# Patient Record
Sex: MALE | Race: WHITE
[De-identification: names, ages, dates, MRNs, and addresses within clinical notes are randomized per-mention and may not be internally consistent; named-entity substitution may affect disease eponyms.]

---

## 2019-03-11 ENCOUNTER — HOSPITAL ENCOUNTER (OUTPATIENT)
Dept: HOSPITAL 44 - OPSURG | Age: 69
Discharge: HOME | End: 2019-03-11
Attending: ORTHOPAEDIC SURGERY
Payer: MEDICARE

## 2019-03-11 DIAGNOSIS — G56.02: Primary | ICD-10-CM

## 2019-03-11 DIAGNOSIS — G56.22: ICD-10-CM

## 2019-03-11 PROCEDURE — 64718 REVISE ULNAR NERVE AT ELBOW: CPT

## 2019-03-11 PROCEDURE — 64721 CARPAL TUNNEL SURGERY: CPT

## 2019-03-12 NOTE — OPERATIVE NOTE
PREOPERATIVE DIAGNOSIS:  

1. Carpal tunnel syndrome, left arm.  

2. Cubital tunnel syndrome, left arm.  



POSTOPERATIVE DIAGNOSIS:  

1. Carpal tunnel syndrome, left arm.  

2. Cubital tunnel syndrome, left arm.  



PROCEDURES PERFORMED:  

1. Endoscopic decompression of the median nerve at the left wrist.  

2. Endoscopic decompression of the ulnar nerve at the left wrist.  

3. Subcutaneous transposition of the ulnar nerve at the left elbow utilizing the
intermuscular septum sling technique.  

4. Microscopic fascicular neurolysis of the ulnar nerve at the left elbow.  



SURGEON:

Viviana Campo Jr., M.D.



ASSISTANT:

None.



ANESTHESIA:

General.   



COMPLICATIONS:

None.



CONDITION FOLLOWING THE PROCEDURE:

Good.



OPERATIVE FINDINGS:  Mr. Lawrence has electromyographically positive and 
symptomatic carpal tunnel syndrome and cubital tunnel syndrome.   Given these 
abnormalities, and failure to respond to conservative treatment, surgical care 
was undertaken.  This was carried out today.  Endoscopic carpal tunnel release 
provided good visualization of the septum between Guyon's canal and the carpal 
canal.  The ulnar nerve was found to be replaced or invested with a significant 
amount of fat from chronic pressure.  He was transposed and stable in the 
transposed position and internal neurolysis carried out.   



DESCRIPTION OF PROCEDURE:  The patient was taken to the operating room and 
placed in supine position.  Skin infiltration with 2% lidocaine was carried out 
on the ulnar side of the wrist proximal to the wrist crease.  It was also 
carried out at the ulnar aspect of the proximal palmar crease.  The arm was then
thoroughly scrubbed and sterilely prepped and draped.  The arm was exsanguinated
and tourniquet inflated.  A small transverse incision was made on the ulnar 
aspect proximal to the wrist.  Waller scissors were then used to penetrate the 
volar fascia and enter the carpal canal.  A skin incision was also made within 
the skin crease at the confluence of a line extended from the ring finger and a 
line extended transversely from the base of the thumb-index webspace.  The 
cannula with a boat obturator was then placed into the carpal tunnel through 
the proximal incision.  The floor and the hamate were carefully probed sweeping 
all tendinous structures and soft tissue structures to the radial side.  Keeping
the boat obturator and the cannula directed in all the directions, the carpal 
tunnel was completely traversed holding on the soft tissue and tendinous 
structures to the radial side.  Care was taken not to pass the cannula into 
[place] canal.  The boat obturator was then led to be externalized through the 
transverse incisions made in the palm.  The opening in the cannula was then 
directed in the ulnar direction.  The obturator was then removed, and the wrist 
was placed into the holding tray with hyperextension and digits held in 
extension.  A cotton applicator was passed through the cannula.  The endoscope 
was then inserted, visualizing the transverse carpal ligament and its confluence
with the Guyon canal.  Repositioning would be carried out if any tendinous 
structures were found creating an obstructive view of the transverse carpal 
ligament.  Once a transverse carpal ligament unobstructed view was obtained, the
white handle knife blade was inserted, and the proximal aspect of the transverse
carpal ligament in its most ulnar extent was engaged.  The knife was advanced, 
cutting through the layers of the transverse carpal ligament layer by layer 
until Guyon canal was entered.  It was continued distally, and also a 
retrograded knife was used to engage the most distal aspect of the transverse 
carpal ligament and usually some hypothenar muscular fibers were engaged as 
well.  Complete unification of the Guyon canal and the carpal canal was thus 
performed by dividing the transverse carpal ligament at its insertion on the 
hamate which left the transverse carpal ligament and the covering of the Guyon 
canal as an intact structure, now  away from the hamate and carpus.  
Good visualization of the ulnar neurovascular bundle was thus afforded.  The 
cannula was then removed; and using a Ragnell retractor and appropriate 
lighting, the volar fascia was divided under direct visualization proximally and
distally from the small transverse hole proximal to the wrist to be sure that it
joined and even helped with proper unification.  Copious irrigation was followed
by closure of the wound with 4-0 Ethilon.  A sterile bulky dressing was applied.
 



The affected arm was thoroughly prepped and draped.  Tourniquet was inflated 
after exsanguination.  A curvilinear incision was made directly over the ulnar 
nerve groove on the medial aspect of the elbow.  Subcutaneous dissection 
continued down to the volar forearm fascia.  The ulnar nerve was identified 
proximal to its entrance into the cubital tunnel.  A blunt instrument was then 
placed into the cubital tunnel, which was then sequentially opened with a 
scalpel, freeing the ulnar nerve.  This continued for the distance of 2.5 cm 
distal to the medial epicondyle into the volar forearm fascia.  With Ragnell 
retractor  the muscle, the volar forearm fascia muscular tissue was 
divided directly over the ulnar nerve, freeing the ulnar nerve from any further 
fascial tissue and vasculature and areas of compression until the depths of the 
volar forearm musculature reached and no further compression of the nerve was 
found. Proximally, separation and neurolysis of the ulnar nerve continued.  The 
intermuscular septum was identified and  from tissues using an [employer] retractor to protect the ulnar nerve.  The intermuscular septum was 
developed as a flap attached to the medial epicondyle and divided proximally to 
yield a 2.5-cm long flap attached to the medial epicondyle.  Neurolysis of the 
ulnar nerve then continued through the cubital tunnel until the ulnar nerve was 
free and able to be mobilized to the subcutaneous position over the medial 
epicondyle.  The sling fashioned from the intermuscular septum was then passed 
around the ulnar nerve.  The sling had a substantial amount of fat, which was 
now giving good padding to the ulnar nerve.  Using 4-0 Ethibond, the flap of the
intermuscular septum was secured to the volar forearm fascia in such a manner to
provide a stable fat-laden tunnel over the transposed position anterior to the 
medial epicondyle.  Internal neurolysis of the ulnar nerve was then carried out.
 Directing attention to the re-entrance of the ulnar nerve into the volar 
forearm fascia, an additional L-type incision was made in the volar forearm 
fascia.  One limb of the L was recessed and reapproximated to the opposite side 
of the volar forearm fascia, creating a triangular opening in the volar forearm 
fascia through which the ulnar nerve now re-entered into the volar forearm 
muscular mass.  This provided a triangular opening, which was not likely to 
cause fascial compression upon the ulnar nerve as it re-entered the volar 
forearm musculature.  The copious fat of this subcutaneous layer, which had been
allowed to remain, was then folded over the top of the ulnar nerve and secured 
with absorbable sutures.  Stainless steel staples then approximated the skin.  
Before effecting closure, the arm was put through range of motion to be sure 
complete freedom of motion of the ulnar nerve exists and it does not come under 
significant tension or angular changes during complete flexion or extension.  
The patient was then taken to the recovery room in good condition.  

 





VIVIANA CAMPO JR., M.D.

KISHA/myrtle 

Job #[id number] 

D: 03/11/19 @ 1725

T: 03/12/19 @ 1508 

MTDD